# Patient Record
Sex: MALE | Race: WHITE | URBAN - NONMETROPOLITAN AREA
[De-identification: names, ages, dates, MRNs, and addresses within clinical notes are randomized per-mention and may not be internally consistent; named-entity substitution may affect disease eponyms.]

---

## 2021-08-20 ENCOUNTER — IMPORTED ENCOUNTER (OUTPATIENT)
Dept: URBAN - NONMETROPOLITAN AREA CLINIC 1 | Facility: CLINIC | Age: 36
End: 2021-08-20

## 2021-08-20 PROBLEM — H52.223: Noted: 2021-08-20

## 2021-08-20 PROBLEM — H52.13: Noted: 2021-08-20

## 2021-08-20 PROCEDURE — 92015 DETERMINE REFRACTIVE STATE: CPT

## 2021-08-20 PROCEDURE — 92310 CONTACT LENS FITTING OU: CPT

## 2021-08-20 PROCEDURE — 92004 COMPRE OPH EXAM NEW PT 1/>: CPT

## 2021-08-20 NOTE — PATIENT DISCUSSION
Compound Myopic Astigmatism OU -  discussed findings w/patient-  new spectacle/CL Rx issued-  continue to monitor yearly or prn; 's Notes: MR 8/20/2021DFE 8/20/2021

## 2022-04-10 ASSESSMENT — TONOMETRY
OS_IOP_MMHG: 17
OD_IOP_MMHG: 17

## 2022-04-10 ASSESSMENT — VISUAL ACUITY
OU_CC: J1+
OS_SC: 20/20
OD_SC: 20/20
OU_SC: J1+

## 2025-02-11 ENCOUNTER — COMPREHENSIVE EXAM (OUTPATIENT)
Age: 40
End: 2025-02-11

## 2025-02-11 DIAGNOSIS — H52.223: ICD-10-CM

## 2025-02-11 DIAGNOSIS — H52.13: ICD-10-CM

## 2025-02-11 PROCEDURE — 92014 COMPRE OPH EXAM EST PT 1/>: CPT

## 2025-02-11 PROCEDURE — 92310-1 LEVEL 1 SOFT LENS UPDATE

## 2025-02-11 PROCEDURE — 92015 DETERMINE REFRACTIVE STATE: CPT
